# Patient Record
Sex: FEMALE | Race: WHITE | NOT HISPANIC OR LATINO | Employment: FULL TIME | URBAN - METROPOLITAN AREA
[De-identification: names, ages, dates, MRNs, and addresses within clinical notes are randomized per-mention and may not be internally consistent; named-entity substitution may affect disease eponyms.]

---

## 2024-05-23 LAB
EXTERNAL HEPATITIS B SURFACE ANTIGEN: NEGATIVE
EXTERNAL HEPATITIS C AB: POSITIVE
EXTERNAL RUBELLA QUALITATIVE: NORMAL
EXTERNAL SYPHILIS RPR SCREEN: NEGATIVE
HIV1 P24 AG SERPL QL IA: NORMAL

## 2024-06-06 ENCOUNTER — HOSPITAL ENCOUNTER (EMERGENCY)
Facility: HOSPITAL | Age: 29
Discharge: HOME | End: 2024-06-06
Attending: EMERGENCY MEDICINE | Admitting: EMERGENCY MEDICINE
Payer: OTHER MISCELLANEOUS

## 2024-06-06 ENCOUNTER — APPOINTMENT (OUTPATIENT)
Dept: ULTRASOUND IMAGING | Facility: HOSPITAL | Age: 29
End: 2024-06-06
Payer: OTHER MISCELLANEOUS

## 2024-06-06 VITALS
WEIGHT: 170 LBS | BODY MASS INDEX: 25.76 KG/M2 | HEART RATE: 75 BPM | TEMPERATURE: 97.8 F | OXYGEN SATURATION: 96 % | RESPIRATION RATE: 14 BRPM | SYSTOLIC BLOOD PRESSURE: 102 MMHG | DIASTOLIC BLOOD PRESSURE: 74 MMHG | HEIGHT: 68 IN

## 2024-06-06 DIAGNOSIS — Z77.098 EXPOSURE TO INDUSTRIAL FUMES: Primary | ICD-10-CM

## 2024-06-06 DIAGNOSIS — Z3A.11 11 WEEKS GESTATION OF PREGNANCY: ICD-10-CM

## 2024-06-06 LAB
BILIRUB UR QL STRIP: NEGATIVE
CLARITY UR: CLEAR
COHGB MFR BLD: 2.3 % (ref 0–3)
COLOR UR: YELLOW
GLUCOSE UR STRIP-MCNC: NEGATIVE MG/DL
HCG INTACT+B SERPL-ACNC: NORMAL MIU/ML
HGB UR QL STRIP.AUTO: NEGATIVE
KETONES UR QL STRIP: NEGATIVE
LEUKOCYTE ESTERASE UR QL STRIP.AUTO: NEGATIVE
NITRITE UR QL STRIP: NEGATIVE
PH UR STRIP.AUTO: 5.5 [PH] (ref 5–8)
PROT UR QL STRIP: NEGATIVE
SP GR UR STRIP: 1.01 (ref 1–1.03)
UROBILINOGEN UR QL STRIP: NORMAL

## 2024-06-06 PROCEDURE — 99284 EMERGENCY DEPT VISIT MOD MDM: CPT

## 2024-06-06 PROCEDURE — 93976 VASCULAR STUDY: CPT

## 2024-06-06 PROCEDURE — 82375 ASSAY CARBOXYHB QUANT: CPT | Performed by: EMERGENCY MEDICINE

## 2024-06-06 PROCEDURE — 84702 CHORIONIC GONADOTROPIN TEST: CPT | Performed by: EMERGENCY MEDICINE

## 2024-06-06 PROCEDURE — 96374 THER/PROPH/DIAG INJ IV PUSH: CPT

## 2024-06-06 PROCEDURE — 25010000002 METOCLOPRAMIDE PER 10 MG: Performed by: EMERGENCY MEDICINE

## 2024-06-06 PROCEDURE — 81003 URINALYSIS AUTO W/O SCOPE: CPT | Performed by: EMERGENCY MEDICINE

## 2024-06-06 PROCEDURE — 25810000003 LACTATED RINGERS SOLUTION: Performed by: EMERGENCY MEDICINE

## 2024-06-06 PROCEDURE — 76801 OB US < 14 WKS SINGLE FETUS: CPT

## 2024-06-06 RX ORDER — SODIUM CHLORIDE 0.9 % (FLUSH) 0.9 %
10 SYRINGE (ML) INJECTION AS NEEDED
Status: DISCONTINUED | OUTPATIENT
Start: 2024-06-06 | End: 2024-06-06 | Stop reason: HOSPADM

## 2024-06-06 RX ORDER — METOCLOPRAMIDE HYDROCHLORIDE 5 MG/ML
10 INJECTION INTRAMUSCULAR; INTRAVENOUS ONCE
Status: COMPLETED | OUTPATIENT
Start: 2024-06-06 | End: 2024-06-06

## 2024-06-06 RX ADMIN — SODIUM CHLORIDE, POTASSIUM CHLORIDE, SODIUM LACTATE AND CALCIUM CHLORIDE 500 ML: 600; 310; 30; 20 INJECTION, SOLUTION INTRAVENOUS at 09:14

## 2024-06-06 RX ADMIN — METOCLOPRAMIDE 10 MG: 5 INJECTION, SOLUTION INTRAMUSCULAR; INTRAVENOUS at 09:15

## 2024-06-06 NOTE — DISCHARGE INSTRUCTIONS
Follow-up with your OB/GYN on the 14th as scheduled.  Off of work today rest plenty of fluids  Return for severe headaches vomiting shortness of breath abdominal pain vaginal bleeding or any other new or worse problems or concerns return immediately to the ER.

## 2024-06-06 NOTE — ED PROVIDER NOTES
Subjective   History of Present Illness  Chief complaint pregnancy and exposure to fumes    History of present illness a 28-year-old female who is about 13 weeks pregnant who states she was at work indoors when the workplace was overcome with some sort of fumes which they think is propane.  Not quite clear.  But she started to feel nauseous and somewhat lightheaded and had a headache so EMS suggested she go to the hospital get checked since she is 13 weeks pregnant.  Upon arrival here she states she had a little abdominal cramping but she feels somewhat nauseous little bit of a headache but no vomiting.  No bleeding.  But is feeling better.  No cough congestion or shortness of breath no recent injury illness flus viruses vaccinations.  She has been seen during this pregnancy by the nurse practitioner and has had good fetal heart tones.  Denies urinary or bowel problems      Review of Systems   Constitutional:  Negative for chills and fever.   HENT:  Negative for sore throat, trouble swallowing and voice change.    Respiratory:  Negative for cough, chest tightness and shortness of breath.    Gastrointestinal:  Positive for abdominal pain. Negative for vomiting.   Genitourinary:  Negative for difficulty urinating, dysuria and vaginal bleeding.   Skin:  Negative for rash.   Neurological:  Positive for headaches. Negative for dizziness, facial asymmetry, speech difficulty and light-headedness.       No past medical history on file.  Currently 13 weeks pregnant  No Known Allergies    No past surgical history on file.    No family history on file.    Social History     Socioeconomic History    Marital status:      No tobacco alcohol or drugs  No medication    Objective   Physical Exam  Constitutional 28-year-old female awake alert no distress triage vital signs reviewed.  HEENT extraocular muscles are intact pupils equal and reactive no photophobia mouth is clear there is no cough or stridor drooling or edema.   Voice is normal neck is supple no adenopathy no meningeal signs lungs clear no retraction no use of accessory heart regular without murmur abdomen soft nontender good bowel sounds no peritoneal findings or other masses extremities no edema is no rash anywhere in no cords or Homans' sign no evidence of DVT skin warm dry without rashes neurologic awake alert and orientated x 4 no Paci symmetry speech normal without focal weakness walks without difficulty no ataxia  Procedures           ED Course      Results for orders placed or performed during the hospital encounter of 06/06/24   hCG, Quantitative, Pregnancy    Specimen: Blood   Result Value Ref Range    HCG Quantitative 69,621.00 mIU/mL   Urinalysis With Microscopic If Indicated (No Culture) - Urine, Clean Catch    Specimen: Urine, Clean Catch   Result Value Ref Range    Color, UA Yellow Yellow, Straw    Appearance, UA Clear Clear    pH, UA 5.5 5.0 - 8.0    Specific Gravity, UA 1.014 1.005 - 1.030    Glucose, UA Negative Negative    Ketones, UA Negative Negative    Bilirubin, UA Negative Negative    Blood, UA Negative Negative    Protein, UA Negative Negative    Leuk Esterase, UA Negative Negative    Nitrite, UA Negative Negative    Urobilinogen, UA 1.0 E.U./dL 0.2 - 1.0 E.U./dL   Carbon Monoxide, Blood    Specimen: Blood   Result Value Ref Range    Carbon Monoxide, Blood 2.3 0 - 3 %     US Ob < 14 Weeks Single or First Gestation    Result Date: 6/6/2024  Impression: Viable approximate 11-week 6-day IUP. Electronically Signed: Enid Hall MD  6/6/2024 10:24 AM EDT  Workstation ID: [id number]   Medications   sodium chloride 0.9 % flush 10 mL (has no administration in time range)   lactated ringers bolus 500 mL (500 mL Intravenous New Bag 6/6/24 0914)   metoclopramide (REGLAN) injection 10 mg (10 mg Intravenous Given 6/6/24 0915)                                              Medical Decision Making  Medical decision making IV established fetal heart tones were good  lactated Ringer's 500 cc bolus Reglan 10 mg IV for nausea car monoxide level reviewed by me was 2.3 urine negative pregnancy test 69,621.  All labs independent reviewed by me ultrasound obtained my review of the radiology report normal intrauterine pregnancy at 11 weeks 6 days normal heartbeat.  Patient exam was feeling better after the fluids and Reglan.  Her abdomen soft without tenderness.  I do not see any evidence of car monoxide exposure I do not see any evidence that suggest airway compromise or pneumonia or pneumonitis or any other type of anaphylaxis allergic reaction or any evidence for UTI or miscarriage or ectopic pregnancy or other intra-abdominal process or cardiac etiology.  Patient has been exposed to some sort of fumes most likely propane but is feeling much better she will be discharged home for outpatient management and follow-up she has an upcoming appoint with her OB/GYN we talked about what to return for stable otherwise unremarkable ER course    Problems Addressed:  11 weeks gestation of pregnancy: complicated acute illness or injury  Exposure to industrial fumes: complicated acute illness or injury    Amount and/or Complexity of Data Reviewed  Labs: ordered. Decision-making details documented in ED Course.  Radiology: ordered.        Final diagnoses:   Exposure to industrial fumes   11 weeks gestation of pregnancy       ED Disposition  ED Disposition       ED Disposition   Discharge    Condition   Stable    Comment   --               PATIENT CONNECTION - [facility] [address]  [phone]  In 1 day           Medication List      No changes were made to your prescriptions during this visit.            Shahab Galindo MD  06/07/24 0871

## 2024-06-18 ENCOUNTER — OFFICE VISIT (OUTPATIENT)
Dept: ENDOCRINOLOGY | Facility: CLINIC | Age: 29
End: 2024-06-18
Payer: MEDICAID

## 2024-06-18 ENCOUNTER — LAB (OUTPATIENT)
Dept: LAB | Facility: HOSPITAL | Age: 29
End: 2024-06-18
Payer: MEDICAID

## 2024-06-18 VITALS
HEART RATE: 84 BPM | HEIGHT: 68 IN | SYSTOLIC BLOOD PRESSURE: 110 MMHG | OXYGEN SATURATION: 99 % | WEIGHT: 171 LBS | DIASTOLIC BLOOD PRESSURE: 72 MMHG | BODY MASS INDEX: 25.91 KG/M2

## 2024-06-18 DIAGNOSIS — R94.6 ABNORMAL THYROID FUNCTION TEST: Primary | ICD-10-CM

## 2024-06-18 DIAGNOSIS — R94.6 ABNORMAL THYROID FUNCTION TEST: ICD-10-CM

## 2024-06-18 PROCEDURE — 84439 ASSAY OF FREE THYROXINE: CPT

## 2024-06-18 PROCEDURE — 36415 COLL VENOUS BLD VENIPUNCTURE: CPT

## 2024-06-18 PROCEDURE — 1160F RVW MEDS BY RX/DR IN RCRD: CPT | Performed by: INTERNAL MEDICINE

## 2024-06-18 PROCEDURE — 99204 OFFICE O/P NEW MOD 45 MIN: CPT | Performed by: INTERNAL MEDICINE

## 2024-06-18 PROCEDURE — 84443 ASSAY THYROID STIM HORMONE: CPT

## 2024-06-18 PROCEDURE — 84481 FREE ASSAY (FT-3): CPT

## 2024-06-18 PROCEDURE — 86376 MICROSOMAL ANTIBODY EACH: CPT

## 2024-06-18 PROCEDURE — 1159F MED LIST DOCD IN RCRD: CPT | Performed by: INTERNAL MEDICINE

## 2024-06-18 PROCEDURE — 84445 ASSAY OF TSI GLOBULIN: CPT

## 2024-06-18 RX ORDER — DIPHENHYDRAMINE HYDROCHLORIDE 25 MG/1
CAPSULE ORAL
COMMUNITY
Start: 2024-05-09 | End: 2024-06-18

## 2024-06-18 RX ORDER — BENZOCAINE/MENTHOL 6 MG-10 MG
LOZENGE MUCOUS MEMBRANE
COMMUNITY
Start: 2024-05-14 | End: 2024-06-18

## 2024-06-18 RX ORDER — PNV NO.95/FERROUS FUM/FOLIC AC 28MG-0.8MG
1 TABLET ORAL DAILY
COMMUNITY
Start: 2024-06-05

## 2024-06-18 RX ORDER — FERROUS SULFATE 325(65) MG
325 TABLET ORAL
COMMUNITY

## 2024-06-18 NOTE — LETTER
June 18, 2024     Kalie Barnes MD  [facility] [facility] [address]  [place] [address]    Patient: Natalie GALEANO   YOB: 1995   Date of Visit: 6/18/2024     Dear Kalie Barnes MD:       Thank you for referring Natalie GALEANO to me for evaluation. Below are the relevant portions of my assessment and plan of care.    If you have questions, please do not hesitate to call me. I look forward to following Natalie along with you.         Sincerely,        Sven Edwards MD        CC: No Recipients    Sven Edwards MD  06/18/24 1558  Sign when Signing Visit  Endocrine Consult Outpatient  Referred by Dr. Kalie Barnes for abnormal thyroid function test in pregnancy  Patient Care Team:  Provider, No Known as PCP - General     Chief Complaint: Low TSH/abnormal thyroid function tests         HPI: This is a 28-year-old female who is accompanied today with her  for abnormal thyroid function test which was found during pregnancy.  She tells me that she is 13 weeks pregnant and this is her pregnancy #4.  She did not have any thyroid problems during the first 3 pregnancies or in between pregnancies.  She is taking a multivitamin and iron supplement but she is not taking any biotin or any other supplements at this time.  She denies any tremors, sweating, palpitations, mood swings or any other significant issues at this time.    Past Medical History:   Diagnosis Date   • Hepatitis C        Social History     Socioeconomic History   • Marital status:      Spouse name: Imtiaz   • Number of children: 2   • Years of education: GED   Tobacco Use   • Smoking status: Never   • Smokeless tobacco: Never   Vaping Use   • Vaping status: Every Day   • Substances: Nicotine   • Devices: Disposable   Substance and Sexual Activity   • Alcohol use: Never   • Drug use: Never   • Sexual activity: Yes       History reviewed. No pertinent family history.    No Known Allergies    ROS:   Constitutional:   "Admit fatigue, tiredness.    Eyes:  Denies change in visual acuity   HENT:  Denies nasal congestion or sore throat   Respiratory: denies cough, shortness of breath.   Cardiovascular:  denies chest pain, edema   GI:  Denies abdominal pain,Admit  AM morning sickness  :  Denies dysuria   Musculoskeletal:  Denies back pain or joint pain   Integument:  Denies dry skin, rash   Neurologic:  Denies headache, focal weakness or sensory changes   Endocrine:  Denies polyuria or polydipsia   Psychiatric:  Denies depression or anxiety      Vitals:    06/18/24 1458   BP: 110/72   Pulse: 84   SpO2: 99%     Body mass index is 26 kg/m².      Physical Exam:  GEN: NAD, conversant  EYES: EOMI, PERRL  NECK: no thyromegaly, full ROM   CV: RRR  LUNG: CTA  SKIN: no rashes, no acanthosis  MSK: no deformities,   NEURO: no tremors, DTR normal  PSYCH: Awake and coherent      Results Review:     I reviewed the patient's new clinical results.    Lab Results   Component Value Date    BUN 13 09/17/2018    CREATININE 0.9 09/17/2018    BCR 14.4 09/17/2018    K 2.8 (L) 09/17/2018    CO2 24 09/17/2018    CALCIUM 9.4 09/17/2018    ALBUMIN 4.3 09/17/2018    LABIL2 1.2 09/17/2018    AST 79 (H) 09/17/2018    ALT 45 09/17/2018     No results found for: \"HGBA1C\"  Lab Results   Component Value Date    CREATININE 0.9 09/17/2018     Labs from May 25, 2024 showed a TSH of 0.12, free T4 was 1.2.    Medication Review: Reviewed.       Current Outpatient Medications:   •  ferrous sulfate 325 (65 FE) MG tablet, Take 1 tablet by mouth Daily With Breakfast., Disp: , Rfl:   •  Prenatal Vit-Fe Fumarate-FA (prenatal vitamin 28-0.8) 28-0.8 MG tablet tablet, Take 1 tablet by mouth Daily., Disp: , Rfl:         Assessment and plan:  Abnormal thyroid function test: This is a 28-year-old female who is currently 13 weeks pregnant, she was found to have mild low TSH with normal free T4.  This could be a physiological response from pregnancy.  I will try to explain this and " answered all questions.  At this time we will recheck TSH with maternal free T4 as well as free T3 and TSI and TPO antibodies.  Will make further recommendations once the labs are available.    Thank you very much for the consultation.    Sven Edwards MD FACE.

## 2024-06-18 NOTE — PROGRESS NOTES
Endocrine Consult Outpatient  Referred by Dr. Kalie Barnes for abnormal thyroid function test in pregnancy  Patient Care Team:  Provider, No Known as PCP - General     Chief Complaint: Low TSH/abnormal thyroid function tests         HPI: This is a 28-year-old female who is accompanied today with her  for abnormal thyroid function test which was found during pregnancy.  She tells me that she is 13 weeks pregnant and this is her pregnancy #4.  She did not have any thyroid problems during the first 3 pregnancies or in between pregnancies.  She is taking a multivitamin and iron supplement but she is not taking any biotin or any other supplements at this time.  She denies any tremors, sweating, palpitations, mood swings or any other significant issues at this time.    Past Medical History:   Diagnosis Date    Hepatitis C        Social History     Socioeconomic History    Marital status:      Spouse name: Imtiaz    Number of children: 2    Years of education: GED   Tobacco Use    Smoking status: Never    Smokeless tobacco: Never   Vaping Use    Vaping status: Every Day    Substances: Nicotine    Devices: Disposable   Substance and Sexual Activity    Alcohol use: Never    Drug use: Never    Sexual activity: Yes       History reviewed. No pertinent family history.    No Known Allergies    ROS:   Constitutional:  Admit fatigue, tiredness.    Eyes:  Denies change in visual acuity   HENT:  Denies nasal congestion or sore throat   Respiratory: denies cough, shortness of breath.   Cardiovascular:  denies chest pain, edema   GI:  Denies abdominal pain,Admit  AM morning sickness  :  Denies dysuria   Musculoskeletal:  Denies back pain or joint pain   Integument:  Denies dry skin, rash   Neurologic:  Denies headache, focal weakness or sensory changes   Endocrine:  Denies polyuria or polydipsia   Psychiatric:  Denies depression or anxiety      Vitals:    06/18/24 1458   BP: 110/72   Pulse: 84   SpO2: 99%     Body  "mass index is 26 kg/m².      Physical Exam:  GEN: NAD, conversant  EYES: EOMI, PERRL  NECK: no thyromegaly, full ROM   CV: RRR  LUNG: CTA  SKIN: no rashes, no acanthosis  MSK: no deformities,   NEURO: no tremors, DTR normal  PSYCH: Awake and coherent      Results Review:     I reviewed the patient's new clinical results.    Lab Results   Component Value Date    BUN 13 09/17/2018    CREATININE 0.9 09/17/2018    BCR 14.4 09/17/2018    K 2.8 (L) 09/17/2018    CO2 24 09/17/2018    CALCIUM 9.4 09/17/2018    ALBUMIN 4.3 09/17/2018    LABIL2 1.2 09/17/2018    AST 79 (H) 09/17/2018    ALT 45 09/17/2018     No results found for: \"HGBA1C\"  Lab Results   Component Value Date    CREATININE 0.9 09/17/2018     Labs from May 25, 2024 showed a TSH of 0.12, free T4 was 1.2.    Medication Review: Reviewed.       Current Outpatient Medications:     ferrous sulfate 325 (65 FE) MG tablet, Take 1 tablet by mouth Daily With Breakfast., Disp: , Rfl:     Prenatal Vit-Fe Fumarate-FA (prenatal vitamin 28-0.8) 28-0.8 MG tablet tablet, Take 1 tablet by mouth Daily., Disp: , Rfl:         Assessment and plan:  Abnormal thyroid function test: This is a 28-year-old female who is currently 13 weeks pregnant, she was found to have mild low TSH with normal free T4.  This could be a physiological response from pregnancy.  I will try to explain this and answered all questions.  At this time we will recheck TSH with maternal free T4 as well as free T3 and TSI and TPO antibodies.  Will make further recommendations once the labs are available.    Thank you very much for the consultation.    Sven Edwards MD FACE.                   "

## 2024-06-19 ENCOUNTER — TELEPHONE (OUTPATIENT)
Dept: ENDOCRINOLOGY | Facility: CLINIC | Age: 29
End: 2024-06-19

## 2024-06-19 LAB
FREE T4, MATERNAL: 1.34 NG/DL
T3FREE SERPL-MCNC: 3.34 PG/ML (ref 2–4.4)
TSH SERPL DL<=0.05 MIU/L-ACNC: 0.52 UIU/ML (ref 0.27–4.2)

## 2024-06-20 LAB — THYROPEROXIDASE AB SERPL-ACNC: <9 IU/ML (ref 0–34)

## 2024-06-22 LAB — TSI SER-ACNC: <0.1 IU/L (ref 0–0.55)

## 2024-07-10 ENCOUNTER — HOSPITAL ENCOUNTER (OUTPATIENT)
Facility: HOSPITAL | Age: 29
Discharge: HOME | End: 2024-07-10
Attending: EMERGENCY MEDICINE
Payer: COMMERCIAL

## 2024-07-10 VITALS
TEMPERATURE: 97.9 F | WEIGHT: 180 LBS | RESPIRATION RATE: 18 BRPM | HEART RATE: 90 BPM | BODY MASS INDEX: 27.28 KG/M2 | SYSTOLIC BLOOD PRESSURE: 122 MMHG | DIASTOLIC BLOOD PRESSURE: 72 MMHG | OXYGEN SATURATION: 99 % | HEIGHT: 68 IN

## 2024-07-10 DIAGNOSIS — O21.9 VOMITING DURING PREGNANCY: Primary | ICD-10-CM

## 2024-07-10 LAB
BILIRUB UR QL STRIP: NEGATIVE
CLARITY UR: CLEAR
COLOR UR: YELLOW
GLUCOSE UR STRIP-MCNC: NEGATIVE MG/DL
HGB UR QL STRIP.AUTO: NEGATIVE
KETONES UR QL STRIP: NEGATIVE
LEUKOCYTE ESTERASE UR QL STRIP.AUTO: NEGATIVE
NITRITE UR QL STRIP: NEGATIVE
PH UR STRIP.AUTO: 6 [PH] (ref 5–8)
PROT UR QL STRIP: NEGATIVE
SP GR UR STRIP: >=1.03 (ref 1–1.03)
UROBILINOGEN UR QL STRIP: NORMAL

## 2024-07-10 PROCEDURE — 81003 URINALYSIS AUTO W/O SCOPE: CPT | Performed by: EMERGENCY MEDICINE

## 2024-07-10 PROCEDURE — G0463 HOSPITAL OUTPT CLINIC VISIT: HCPCS | Performed by: EMERGENCY MEDICINE

## 2024-07-10 PROCEDURE — 63710000001 ONDANSETRON ODT 4 MG TABLET DISPERSIBLE: Performed by: EMERGENCY MEDICINE

## 2024-07-10 RX ORDER — ONDANSETRON 4 MG/1
4 TABLET, ORALLY DISINTEGRATING ORAL ONCE
Status: COMPLETED | OUTPATIENT
Start: 2024-07-10 | End: 2024-07-10

## 2024-07-10 RX ORDER — ONDANSETRON 4 MG/1
4 TABLET, ORALLY DISINTEGRATING ORAL EVERY 6 HOURS PRN
Qty: 12 TABLET | Refills: 0 | Status: SHIPPED | OUTPATIENT
Start: 2024-07-10

## 2024-07-10 RX ADMIN — ONDANSETRON 4 MG: 4 TABLET, ORALLY DISINTEGRATING ORAL at 21:58

## 2024-07-10 NOTE — Clinical Note
[place] [address] [place] [address] [place] [address]  Phone: [phone]    Natalie GALEANO was seen and treated in our emergency department on 7/10/2024.  She may return to work on 07/11/2024.  Patient arrived at our facility at 848 pm and was discharged from the facility at 1045 pm on 7/10/24       Thank you for choosing [facility].    August Hutson MD

## 2024-07-10 NOTE — Clinical Note
[place] [address] [place] [address] [place] [address]  Phone: [phone]    Natalie GALEANO was seen and treated in our emergency department on 7/10/2024.  She may return to work on 07/12/2024.  Patient arrived at facility at        Thank you for choosing [facility].    August Hutson MD

## 2024-07-10 NOTE — Clinical Note
[facility] [address] [place] [address] [place] [address]  Phone: [phone]    Natalie GALEANO was seen and treated in our emergency department on 7/10/2024.  She may return to work on 07/12/2024.  Patient arrived at our facility at 848 pm and was discharged from our facility at 1045 pm on 7/10/2024       Thank you for choosing [facility].    August Hutson MD

## 2024-07-10 NOTE — Clinical Note
[facility] [place] [address] [place] [address] [place] [address]  Phone: [phone]    Natalie GALEANO was seen and treated in our emergency department on 7/10/2024.  She may return to work on 07/12/2024.         Thank you for choosing [facility].    August Hutson MD

## 2024-07-11 NOTE — FSED PROVIDER NOTE
Subjective   History of Present Illness  28-year-old white female presents with vomiting in pregnancy.  She is 16 weeks pregnant and started vomiting last night.  She has vomited a total of 6 times, most recently this morning.  He did not come in earlier because she needed her  to be home from work to bring her.  She is not currently nauseous.  She denies any diarrhea, fever, chest pain, shortness of breath, or cough.  She has had some chills with this.  She has not taken any medications.  Her due date is Christmas.  Her next appointment with her OB/GYN is Friday.  This is her fourth pregnancy.  No Known Drug Allergies.        Review of Systems    Past Medical History:   Diagnosis Date    Hepatitis C        No Known Allergies    No past surgical history on file.    No family history on file.    Social History     Socioeconomic History    Marital status:      Spouse name: Imtiaz    Number of children: 2    Years of education: GED   Tobacco Use    Smoking status: Never    Smokeless tobacco: Never   Vaping Use    Vaping status: Every Day    Substances: Nicotine    Devices: Disposable   Substance and Sexual Activity    Alcohol use: Never    Drug use: Never    Sexual activity: Yes           Objective   Physical Exam  Vitals and nursing note reviewed.   Constitutional:       Appearance: Normal appearance. She is well-developed.   HENT:      Head: Normocephalic and atraumatic.      Nose: Nose normal.      Mouth/Throat:      Mouth: Mucous membranes are moist.   Eyes:      Extraocular Movements: Extraocular movements intact.      Pupils: Pupils are equal, round, and reactive to light.   Cardiovascular:      Rate and Rhythm: Normal rate and regular rhythm.      Heart sounds: Normal heart sounds.   Pulmonary:      Effort: Pulmonary effort is normal.      Breath sounds: Normal breath sounds. No wheezing, rhonchi or rales.   Chest:      Chest wall: No tenderness.   Abdominal:      General: There is no distension.       Palpations: Abdomen is soft.      Tenderness: There is no abdominal tenderness. There is no guarding or rebound.   Musculoskeletal:         General: No swelling. Normal range of motion.      Cervical back: Normal range of motion.   Skin:     General: Skin is warm and dry.   Neurological:      General: No focal deficit present.      Mental Status: She is alert and oriented to person, place, and time.   Psychiatric:         Mood and Affect: Mood normal.         Behavior: Behavior normal.         Procedures           ED Course                                           Medical Decision Making  Patient presents with having had 6 episodes of vomiting at home starting last night with the last episode this morning.  This may or may not be related to her pregnancy.  She did not have a ride to come in earlier.  I discussed the option to start the check blood work and give her IV fluids just to make sure she does not have a urinary tract infection and give her some Zofran since she has been eating today and holding food down.  She prefers the latter route.  Urine was completely clear and negative for UTI but had a concentrated specific gravity of greater than 1.03.  Patient was given a Zofran ODT and has been tolerating fluids.  Abdomen repeat exam remains soft, nondistended nontender.  Patient is asking for a work note as she typically goes to work at 5 AM and did not go today due to feeling poorly.  I will give her 1 for tomorrow also because she was here until after 10:00 and would likely benefit from the rest.  She is to call her doctor tomorrow and should keep her appointment on Friday.  Return if worse or concerns.  Patient is happy with this plan.    Problems Addressed:  Vomiting during pregnancy: complicated acute illness or injury    Risk  Prescription drug management.        Final diagnoses:   Vomiting during pregnancy       ED Disposition  ED Disposition       ED Disposition   Discharge    Condition   Stable     Comment   --               Provider, No Known  [facility] [place] [address]    Call in 1 day           Medication List        New Prescriptions      ondansetron ODT 4 MG disintegrating tablet  Commonly known as: ZOFRAN-ODT  Take 1 tablet by mouth Every 6 (Six) Hours As Needed for Nausea or Vomiting for up to 12 doses.               Where to Get Your Medications        These medications were sent to [id number] - [place], [place] - [address] AT [facility] [address] - [phone]  - [phone] FX  [address] [facility] [place] [address]      Phone: [phone]   ondansetron ODT 4 MG disintegrating tablet

## 2024-07-11 NOTE — DISCHARGE INSTRUCTIONS
This vomiting is not necessarily due to your pregnancy.  [place] diet as tolerated.  Frequent small amounts of fluid.  Call your doctor tomorrow and keep your appointment on Friday.  Return if worse or concerns.

## 2024-10-29 ENCOUNTER — PHARMACY IMMUNIZATION REVIEW (OUTPATIENT)
Dept: PHARMACY | Facility: HOSPITAL | Age: 29
End: 2024-10-29
Payer: COMMERCIAL

## 2024-10-29 NOTE — PROGRESS NOTES
[facility] Vaccination Administration    Patient reported to [facility] via referral on 10/29/2024    Allergies:    Patient has no known allergies.    Vaccine History:   Immunization History   Administered Date(s) Administered    ABRYSVO (RSV, 60+ or pregnant women 32-36 wks) 10/29/2024    Tdap 10/29/2024       Plan:    The following vaccines were administered today:  Tdap and RSV    Judith Castaneda  10/29/2024  13:53 EDT

## 2024-10-29 NOTE — PROGRESS NOTES
I have reviewed the notes, assessments, and/or procedures performed by Tiera Castaneda, pharmacy technician, I concur with her documentation of   Natalie GALEANO.

## 2024-11-26 LAB — EXTERNAL GROUP B STREP ANTIGEN: POSITIVE

## 2024-12-07 ENCOUNTER — HOSPITAL ENCOUNTER (OUTPATIENT)
Facility: HOSPITAL | Age: 29
Discharge: HOME | End: 2024-12-07
Attending: OBSTETRICS & GYNECOLOGY | Admitting: OBSTETRICS & GYNECOLOGY
Payer: COMMERCIAL

## 2024-12-07 VITALS
RESPIRATION RATE: 20 BRPM | HEART RATE: 96 BPM | WEIGHT: 235.01 LBS | DIASTOLIC BLOOD PRESSURE: 76 MMHG | SYSTOLIC BLOOD PRESSURE: 114 MMHG | TEMPERATURE: 98.5 F | OXYGEN SATURATION: 99 % | BODY MASS INDEX: 35.62 KG/M2 | HEIGHT: 68 IN

## 2024-12-07 PROBLEM — Z34.90 PREGNANT: Status: ACTIVE | Noted: 2024-12-07

## 2024-12-07 LAB
BILIRUB UR QL STRIP: NEGATIVE
CLARITY UR: CLEAR
COLOR UR: YELLOW
GLUCOSE UR STRIP-MCNC: NEGATIVE MG/DL
HGB UR QL STRIP.AUTO: NEGATIVE
KETONES UR QL STRIP: NEGATIVE
LEUKOCYTE ESTERASE UR QL STRIP.AUTO: NEGATIVE
NITRITE UR QL STRIP: NEGATIVE
PH UR STRIP.AUTO: <=5 [PH] (ref 5–8)
PROT UR QL STRIP: NEGATIVE
SP GR UR STRIP: 1.02 (ref 1–1.03)
UROBILINOGEN UR QL STRIP: NORMAL

## 2024-12-07 PROCEDURE — G0463 HOSPITAL OUTPT CLINIC VISIT: HCPCS

## 2024-12-07 PROCEDURE — 87086 URINE CULTURE/COLONY COUNT: CPT | Performed by: OBSTETRICS & GYNECOLOGY

## 2024-12-07 PROCEDURE — 81003 URINALYSIS AUTO W/O SCOPE: CPT | Performed by: OBSTETRICS & GYNECOLOGY

## 2024-12-09 LAB — BACTERIA SPEC AEROBE CULT: NORMAL

## 2024-12-18 ENCOUNTER — HOSPITAL ENCOUNTER (OUTPATIENT)
Dept: LABOR AND DELIVERY | Facility: HOSPITAL | Age: 29
Discharge: HOME | End: 2024-12-18
Payer: COMMERCIAL

## 2024-12-18 ENCOUNTER — HOSPITAL ENCOUNTER (INPATIENT)
Facility: HOSPITAL | Age: 29
LOS: 2 days | Discharge: HOME | End: 2024-12-20
Attending: OBSTETRICS & GYNECOLOGY | Admitting: OBSTETRICS & GYNECOLOGY
Payer: COMMERCIAL

## 2024-12-18 ENCOUNTER — PREP FOR SURGERY (OUTPATIENT)
Dept: OTHER | Facility: HOSPITAL | Age: 29
End: 2024-12-18
Payer: COMMERCIAL

## 2024-12-18 ENCOUNTER — ANESTHESIA EVENT (OUTPATIENT)
Dept: LABOR AND DELIVERY | Facility: HOSPITAL | Age: 29
End: 2024-12-18
Payer: COMMERCIAL

## 2024-12-18 ENCOUNTER — ANESTHESIA (OUTPATIENT)
Dept: LABOR AND DELIVERY | Facility: HOSPITAL | Age: 29
End: 2024-12-18
Payer: COMMERCIAL

## 2024-12-18 PROBLEM — Z34.90 ENCOUNTER FOR INDUCTION OF LABOR: Status: ACTIVE | Noted: 2024-12-18

## 2024-12-18 LAB
ABO GROUP BLD: NORMAL
AMPHET+METHAMPHET UR QL: NEGATIVE
AMPHETAMINES UR QL: NEGATIVE
BARBITURATES UR QL SCN: NEGATIVE
BASOPHILS # BLD AUTO: 0.04 10*3/MM3 (ref 0–0.2)
BASOPHILS NFR BLD AUTO: 0.4 % (ref 0–1.5)
BENZODIAZ UR QL SCN: NEGATIVE
BLD GP AB SCN SERPL QL: NEGATIVE
BUPRENORPHINE SERPL-MCNC: NEGATIVE NG/ML
CANNABINOIDS SERPL QL: NEGATIVE
COCAINE UR QL: NEGATIVE
DEPRECATED RDW RBC AUTO: 40.1 FL (ref 37–54)
EOSINOPHIL # BLD AUTO: 0.04 10*3/MM3 (ref 0–0.4)
EOSINOPHIL NFR BLD AUTO: 0.4 % (ref 0.3–6.2)
ERYTHROCYTE [DISTWIDTH] IN BLOOD BY AUTOMATED COUNT: 13.1 % (ref 12.3–15.4)
HCT VFR BLD AUTO: 31.6 % (ref 34–46.6)
HGB BLD-MCNC: 10.4 G/DL (ref 12–15.9)
IMM GRANULOCYTES # BLD AUTO: 0.2 10*3/MM3 (ref 0–0.05)
IMM GRANULOCYTES NFR BLD AUTO: 2.1 % (ref 0–0.5)
LYMPHOCYTES # BLD AUTO: 1.18 10*3/MM3 (ref 0.7–3.1)
LYMPHOCYTES NFR BLD AUTO: 12.7 % (ref 19.6–45.3)
MCH RBC QN AUTO: 28 PG (ref 26.6–33)
MCHC RBC AUTO-ENTMCNC: 32.9 G/DL (ref 31.5–35.7)
MCV RBC AUTO: 85.2 FL (ref 79–97)
METHADONE UR QL SCN: NEGATIVE
MONOCYTES # BLD AUTO: 0.66 10*3/MM3 (ref 0.1–0.9)
MONOCYTES NFR BLD AUTO: 7.1 % (ref 5–12)
NEUTROPHILS NFR BLD AUTO: 7.19 10*3/MM3 (ref 1.7–7)
NEUTROPHILS NFR BLD AUTO: 77.3 % (ref 42.7–76)
NRBC BLD AUTO-RTO: 0 /100 WBC (ref 0–0.2)
OPIATES UR QL: NEGATIVE
OXYCODONE UR QL SCN: NEGATIVE
PCP UR QL SCN: NEGATIVE
PLATELET # BLD AUTO: 227 10*3/MM3 (ref 140–450)
PMV BLD AUTO: 9.7 FL (ref 6–12)
RBC # BLD AUTO: 3.71 10*6/MM3 (ref 3.77–5.28)
RH BLD: POSITIVE
T&S EXPIRATION DATE: NORMAL
TREPONEMA PALLIDUM IGG+IGM AB [PRESENCE] IN SERUM OR PLASMA BY IMMUNOASSAY: NORMAL
TRICYCLICS UR QL SCN: NEGATIVE
WBC NRBC COR # BLD AUTO: 9.31 10*3/MM3 (ref 3.4–10.8)

## 2024-12-18 PROCEDURE — 88307 TISSUE EXAM BY PATHOLOGIST: CPT | Performed by: OBSTETRICS & GYNECOLOGY

## 2024-12-18 PROCEDURE — 85025 COMPLETE CBC W/AUTO DIFF WBC: CPT | Performed by: OBSTETRICS & GYNECOLOGY

## 2024-12-18 PROCEDURE — C1755 CATHETER, INTRASPINAL: HCPCS | Performed by: ANESTHESIOLOGY

## 2024-12-18 PROCEDURE — 25810000003 LACTATED RINGERS PER 1000 ML: Performed by: OBSTETRICS & GYNECOLOGY

## 2024-12-18 PROCEDURE — 3E033VJ INTRODUCTION OF OTHER HORMONE INTO PERIPHERAL VEIN, PERCUTANEOUS APPROACH: ICD-10-PCS | Performed by: OBSTETRICS & GYNECOLOGY

## 2024-12-18 PROCEDURE — 86901 BLOOD TYPING SEROLOGIC RH(D): CPT | Performed by: OBSTETRICS & GYNECOLOGY

## 2024-12-18 PROCEDURE — 86901 BLOOD TYPING SEROLOGIC RH(D): CPT

## 2024-12-18 PROCEDURE — 80306 DRUG TEST PRSMV INSTRMNT: CPT | Performed by: OBSTETRICS & GYNECOLOGY

## 2024-12-18 PROCEDURE — 25010000002 PENICILLIN G POTASSIUM PER 600000 UNITS: Performed by: OBSTETRICS & GYNECOLOGY

## 2024-12-18 PROCEDURE — 86780 TREPONEMA PALLIDUM: CPT | Performed by: OBSTETRICS & GYNECOLOGY

## 2024-12-18 PROCEDURE — 10907ZC DRAINAGE OF AMNIOTIC FLUID, THERAPEUTIC FROM PRODUCTS OF CONCEPTION, VIA NATURAL OR ARTIFICIAL OPENING: ICD-10-PCS | Performed by: OBSTETRICS & GYNECOLOGY

## 2024-12-18 PROCEDURE — 94799 UNLISTED PULMONARY SVC/PX: CPT

## 2024-12-18 PROCEDURE — 86900 BLOOD TYPING SEROLOGIC ABO: CPT | Performed by: OBSTETRICS & GYNECOLOGY

## 2024-12-18 PROCEDURE — 86900 BLOOD TYPING SEROLOGIC ABO: CPT

## 2024-12-18 PROCEDURE — 86850 RBC ANTIBODY SCREEN: CPT | Performed by: OBSTETRICS & GYNECOLOGY

## 2024-12-18 PROCEDURE — 25010000002 LIDOCAINE-EPINEPHRINE (PF) 1.5 %-1:200000 SOLUTION: Performed by: ANESTHESIOLOGY

## 2024-12-18 RX ORDER — ACETAMINOPHEN 325 MG/1
650 TABLET ORAL EVERY 4 HOURS PRN
Status: DISCONTINUED | OUTPATIENT
Start: 2024-12-18 | End: 2024-12-18

## 2024-12-18 RX ORDER — OXYTOCIN/0.9 % SODIUM CHLORIDE 30/500 ML
250 PLASTIC BAG, INJECTION (ML) INTRAVENOUS CONTINUOUS
Status: ACTIVE | OUTPATIENT
Start: 2024-12-18 | End: 2024-12-18

## 2024-12-18 RX ORDER — OXYTOCIN/0.9 % SODIUM CHLORIDE 30/500 ML
2-20 PLASTIC BAG, INJECTION (ML) INTRAVENOUS
Status: CANCELLED | OUTPATIENT
Start: 2024-12-18

## 2024-12-18 RX ORDER — METHYLERGONOVINE MALEATE 0.2 MG/ML
200 INJECTION INTRAVENOUS ONCE AS NEEDED
Status: CANCELLED | OUTPATIENT
Start: 2024-12-18

## 2024-12-18 RX ORDER — ONDANSETRON 4 MG/1
4 TABLET, ORALLY DISINTEGRATING ORAL EVERY 6 HOURS PRN
Status: CANCELLED | OUTPATIENT
Start: 2024-12-18

## 2024-12-18 RX ORDER — ONDANSETRON 4 MG/1
4 TABLET, ORALLY DISINTEGRATING ORAL EVERY 6 HOURS PRN
Status: DISCONTINUED | OUTPATIENT
Start: 2024-12-18 | End: 2024-12-19 | Stop reason: HOSPADM

## 2024-12-18 RX ORDER — OXYTOCIN/0.9 % SODIUM CHLORIDE 30/500 ML
999 PLASTIC BAG, INJECTION (ML) INTRAVENOUS ONCE
Status: DISCONTINUED | OUTPATIENT
Start: 2024-12-18 | End: 2024-12-19 | Stop reason: HOSPADM

## 2024-12-18 RX ORDER — METHYLERGONOVINE MALEATE 0.2 MG/ML
200 INJECTION INTRAVENOUS ONCE AS NEEDED
Status: DISCONTINUED | OUTPATIENT
Start: 2024-12-18 | End: 2024-12-19 | Stop reason: HOSPADM

## 2024-12-18 RX ORDER — CARBOPROST TROMETHAMINE 250 UG/ML
250 INJECTION, SOLUTION INTRAMUSCULAR AS NEEDED
Status: DISCONTINUED | OUTPATIENT
Start: 2024-12-18 | End: 2024-12-19 | Stop reason: HOSPADM

## 2024-12-18 RX ORDER — OXYTOCIN/0.9 % SODIUM CHLORIDE 30/500 ML
999 PLASTIC BAG, INJECTION (ML) INTRAVENOUS ONCE
Status: CANCELLED | OUTPATIENT
Start: 2024-12-18 | End: 2024-12-18

## 2024-12-18 RX ORDER — LIDOCAINE HYDROCHLORIDE 10 MG/ML
0.5 INJECTION, SOLUTION EPIDURAL; INFILTRATION; INTRACAUDAL; PERINEURAL ONCE AS NEEDED
Status: DISCONTINUED | OUTPATIENT
Start: 2024-12-18 | End: 2024-12-18

## 2024-12-18 RX ORDER — ONDANSETRON 2 MG/ML
4 INJECTION INTRAMUSCULAR; INTRAVENOUS EVERY 6 HOURS PRN
Status: DISCONTINUED | OUTPATIENT
Start: 2024-12-18 | End: 2024-12-18

## 2024-12-18 RX ORDER — ACETAMINOPHEN 325 MG/1
650 TABLET ORAL EVERY 4 HOURS PRN
Status: CANCELLED | OUTPATIENT
Start: 2024-12-18

## 2024-12-18 RX ORDER — ONDANSETRON 2 MG/ML
4 INJECTION INTRAMUSCULAR; INTRAVENOUS EVERY 6 HOURS PRN
Status: CANCELLED | OUTPATIENT
Start: 2024-12-18

## 2024-12-18 RX ORDER — SODIUM CHLORIDE 0.9 % (FLUSH) 0.9 %
10 SYRINGE (ML) INJECTION EVERY 12 HOURS SCHEDULED
Status: CANCELLED | OUTPATIENT
Start: 2024-12-18

## 2024-12-18 RX ORDER — SODIUM CHLORIDE 9 MG/ML
40 INJECTION, SOLUTION INTRAVENOUS AS NEEDED
Status: DISCONTINUED | OUTPATIENT
Start: 2024-12-18 | End: 2024-12-18

## 2024-12-18 RX ORDER — SODIUM CHLORIDE 0.9 % (FLUSH) 0.9 %
10 SYRINGE (ML) INJECTION AS NEEDED
Status: DISCONTINUED | OUTPATIENT
Start: 2024-12-18 | End: 2024-12-18

## 2024-12-18 RX ORDER — SODIUM CHLORIDE 0.9 % (FLUSH) 0.9 %
10 SYRINGE (ML) INJECTION EVERY 12 HOURS SCHEDULED
Status: DISCONTINUED | OUTPATIENT
Start: 2024-12-18 | End: 2024-12-18

## 2024-12-18 RX ORDER — EPHEDRINE SULFATE 5 MG/ML
10 INJECTION INTRAVENOUS
Status: DISCONTINUED | OUTPATIENT
Start: 2024-12-18 | End: 2024-12-18

## 2024-12-18 RX ORDER — OXYTOCIN/0.9 % SODIUM CHLORIDE 30/500 ML
2-20 PLASTIC BAG, INJECTION (ML) INTRAVENOUS
Status: DISCONTINUED | OUTPATIENT
Start: 2024-12-18 | End: 2024-12-18

## 2024-12-18 RX ORDER — SODIUM CHLORIDE, SODIUM LACTATE, POTASSIUM CHLORIDE, CALCIUM CHLORIDE 600; 310; 30; 20 MG/100ML; MG/100ML; MG/100ML; MG/100ML
50 INJECTION, SOLUTION INTRAVENOUS CONTINUOUS
Status: CANCELLED | OUTPATIENT
Start: 2024-12-18 | End: 2024-12-21

## 2024-12-18 RX ORDER — MISOPROSTOL 200 UG/1
800 TABLET ORAL AS NEEDED
Status: DISCONTINUED | OUTPATIENT
Start: 2024-12-18 | End: 2024-12-19 | Stop reason: HOSPADM

## 2024-12-18 RX ORDER — SODIUM CHLORIDE 0.9 % (FLUSH) 0.9 %
10 SYRINGE (ML) INJECTION AS NEEDED
Status: CANCELLED | OUTPATIENT
Start: 2024-12-18

## 2024-12-18 RX ORDER — LIDOCAINE HYDROCHLORIDE AND EPINEPHRINE 15; 5 MG/ML; UG/ML
INJECTION, SOLUTION EPIDURAL AS NEEDED
Status: DISCONTINUED | OUTPATIENT
Start: 2024-12-18 | End: 2024-12-18 | Stop reason: SURG

## 2024-12-18 RX ORDER — MISOPROSTOL 200 UG/1
800 TABLET ORAL AS NEEDED
Status: CANCELLED | OUTPATIENT
Start: 2024-12-18

## 2024-12-18 RX ORDER — IBUPROFEN 600 MG/1
600 TABLET, FILM COATED ORAL EVERY 6 HOURS PRN
Status: CANCELLED | OUTPATIENT
Start: 2024-12-18

## 2024-12-18 RX ORDER — IBUPROFEN 600 MG/1
600 TABLET, FILM COATED ORAL EVERY 6 HOURS PRN
Status: DISCONTINUED | OUTPATIENT
Start: 2024-12-18 | End: 2024-12-19 | Stop reason: HOSPADM

## 2024-12-18 RX ORDER — ONDANSETRON 4 MG/1
4 TABLET, ORALLY DISINTEGRATING ORAL EVERY 6 HOURS PRN
Status: DISCONTINUED | OUTPATIENT
Start: 2024-12-18 | End: 2024-12-18

## 2024-12-18 RX ORDER — LIDOCAINE HYDROCHLORIDE 10 MG/ML
0.5 INJECTION, SOLUTION EPIDURAL; INFILTRATION; INTRACAUDAL; PERINEURAL ONCE AS NEEDED
Status: CANCELLED | OUTPATIENT
Start: 2024-12-18

## 2024-12-18 RX ORDER — SODIUM CHLORIDE 9 MG/ML
40 INJECTION, SOLUTION INTRAVENOUS AS NEEDED
Status: CANCELLED | OUTPATIENT
Start: 2024-12-18

## 2024-12-18 RX ORDER — SODIUM CHLORIDE, SODIUM LACTATE, POTASSIUM CHLORIDE, CALCIUM CHLORIDE 600; 310; 30; 20 MG/100ML; MG/100ML; MG/100ML; MG/100ML
50 INJECTION, SOLUTION INTRAVENOUS CONTINUOUS
Status: DISCONTINUED | OUTPATIENT
Start: 2024-12-18 | End: 2024-12-18

## 2024-12-18 RX ORDER — LIDOCAINE HYDROCHLORIDE AND EPINEPHRINE 15; 5 MG/ML; UG/ML
INJECTION, SOLUTION EPIDURAL
Status: COMPLETED
Start: 2024-12-18 | End: 2024-12-18

## 2024-12-18 RX ORDER — CARBOPROST TROMETHAMINE 250 UG/ML
250 INJECTION, SOLUTION INTRAMUSCULAR AS NEEDED
Status: CANCELLED | OUTPATIENT
Start: 2024-12-18

## 2024-12-18 RX ORDER — OXYTOCIN/0.9 % SODIUM CHLORIDE 30/500 ML
250 PLASTIC BAG, INJECTION (ML) INTRAVENOUS CONTINUOUS
Status: CANCELLED | OUTPATIENT
Start: 2024-12-18 | End: 2024-12-18

## 2024-12-18 RX ORDER — FENTANYL/BUPIVACAINE/NS/PF 2-1250MCG
PLASTIC BAG, INJECTION (ML) INJECTION
Status: ACTIVE
Start: 2024-12-18 | End: 2024-12-19

## 2024-12-18 RX ORDER — ONDANSETRON 2 MG/ML
4 INJECTION INTRAMUSCULAR; INTRAVENOUS EVERY 6 HOURS PRN
Status: DISCONTINUED | OUTPATIENT
Start: 2024-12-18 | End: 2024-12-19 | Stop reason: HOSPADM

## 2024-12-18 RX ORDER — FENTANYL/BUPIVACAINE/NS/PF 2-1250MCG
PLASTIC BAG, INJECTION (ML) INJECTION CONTINUOUS
Status: DISCONTINUED | OUTPATIENT
Start: 2024-12-18 | End: 2024-12-18

## 2024-12-18 RX ORDER — ACETAMINOPHEN 325 MG/1
650 TABLET ORAL EVERY 4 HOURS PRN
Status: DISCONTINUED | OUTPATIENT
Start: 2024-12-18 | End: 2024-12-19 | Stop reason: HOSPADM

## 2024-12-18 RX ADMIN — SODIUM CHLORIDE 2.5 MILLION UNITS: 900 INJECTION, SOLUTION INTRAVENOUS at 16:55

## 2024-12-18 RX ADMIN — Medication 2 MILLI-UNITS/MIN: at 12:48

## 2024-12-18 RX ADMIN — LIDOCAINE HYDROCHLORIDE,EPINEPHRINE BITARTRATE 5 MG: 15; .005 INJECTION, SOLUTION EPIDURAL; INFILTRATION; INTRACAUDAL; PERINEURAL at 16:44

## 2024-12-18 RX ADMIN — SODIUM CHLORIDE 5 MILLION UNITS: 900 INJECTION INTRAVENOUS at 12:49

## 2024-12-18 RX ADMIN — SODIUM CHLORIDE, SODIUM LACTATE, POTASSIUM CHLORIDE, CALCIUM CHLORIDE 50 ML/HR: 20; 30; 600; 310 INJECTION, SOLUTION INTRAVENOUS at 12:48

## 2024-12-18 NOTE — PLAN OF CARE
Goal Outcome Evaluation:  Plan of Care Reviewed With: patient, spouse        Progress: improving  Outcome Evaluation: Patient is currently 3-4 cm dilated and has made change since IOL started st 1245.  Patient has epidural and rating pain at a 0 on pain scale.  Patient did well through the epidural one blood pressure drop that was brought back to WNL by 250 mL fluid bolus.  Patient has a urinary catheter.  AROM at 1520, clear fluid, large amount.

## 2024-12-18 NOTE — H&P
ALLAN Renee  Obstetric History and Physical     Chief Complaint: IOL at term    Subjective     Patient is a 29 y.o. female  currently at 39w1d, who presents for IOL at term.    Her prenatal care is c/b hx Hep C, anemia.      Prenatal Information:  External Prenatal Results       Pregnancy Outside Results - Transcribed From Office Records - See Scanned Records For Details       Test Value Date Time    ABO  O  24 1159    Rh  Positive  24 1159    Antibody Screen  Negative  24 1159    Varicella IgG       Rubella ^ Immune  24     Hgb  10.4 g/dL 24 1159    Hct  31.6 % 24 1159    HgB A1c        1h GTT       3h GTT Fasting       3h GTT 1 hour       3h GTT 2 hour       3h GTT 3 hour        Gonorrhea (discrete)       Chlamydia (discrete)       RPR ^ Negative  24     Syphils cascade: TP-Ab (FTA)       TP-Ab       TP-Ab (EIA)       TPPA       HBsAg ^ Negative  24     Herpes Simplex Virus PCR       Herpes Simplex VIrus Culture       HIV ^ Non-Reactive  24     Hep C RNA Quant PCR       Hep C Antibody ^ Positive  24     AFP       NIPT       Cystic Fibrosis (Amrik)       Cystic Fibroisis        Spinal Muscular atrophy       Fragile X       Group B Strep ^ Positive  24     GBS Susceptibility to Clindamycin       GBS Susceptibility to Erythromycin       Fetal Fibronectin       Genetic Testing, Maternal Blood                 Drug Screening       Test Value Date Time    Urine Drug Screen       Amphetamine Screen  Negative  24 1206    Barbiturate Screen  Negative  24 1206    Benzodiazepine Screen  Negative  24 1206    Methadone Screen  Negative  24 1206    Phencyclidine Screen  Negative  24 1206    Opiates Screen  Negative  24 1206    THC Screen  Negative  24 1206    Cocaine Screen       Propoxyphene Screen       Buprenorphine Screen  Negative  24 1206    Methamphetamine Screen       Oxycodone Screen  Negative  24  1206    Tricyclic Antidepressants Screen  Negative  24 1206              Legend    ^: Historical                             Past OB History:    Pregnancy History    #1  []: M/. Devin Masters 39wks Vag [facility] comments: no complications  #2  [2015]: F/. Hugh Franco 39wks Vag [facility] comments: no complications  #3  [2018]: [facility] comments: d&c  #4        Past Medical History: Past Medical History:   Diagnosis Date    Hepatitis C         Past Surgical History History reviewed. No pertinent surgical history.     Family History: History reviewed. No pertinent family history.   Social History:  reports that she has never smoked. She has never used smokeless tobacco.   reports no history of alcohol use.   reports no history of drug use.        General ROS: Pertinent items are noted in HPI    Objective      Vitals:     Vitals:    24 1330 24 1400 24 1430 24 1500   BP: 112/79 114/80 111/71    BP Location:       Patient Position:       Pulse: 94 101 83 95   Resp:       Temp:       TempSrc:       SpO2:       Weight:       Height:           Fetal Heart Rate Assessment:   130, mod variability    Alleene:   Every 3-5 min     Physical Exam:     General Appearance:    Alert, cooperative, in no acute distress   Abdomen:     Soft, non-tender, EFW 7 1/2-8 lbs   Pelvic Exam:    Presentation: vtx    Cervix: was checked (by me): 3-4 cm / 75% % / -1 and AROM- Clear, pelvis clinically adequate   Extremities:   Moves all extremities well   Skin:   No bleeding, bruising or rash         Laboratory Results:   Lab Results (last 48 hours)       Procedure Component Value Units Date/Time    Hepatitis B Surface Antigen [[id number]] Resulted: 24    Specimen: Blood Updated: 24 1343     External Hepatitis B Surface Ag Negative    RPR Qualitative with Reflex to Quant [[id number]] Resulted: 24    Specimen: Blood Updated: 24 1343     External RPR Negative    Rubella Antibody, IgG [[id number]] Resulted:  05/23/24    Specimen: Blood Updated: 12/18/24 1343     External Rubella Qual Immune    HIV-1 Antibody, EIA [[id number]] Resulted: 05/23/24    Specimen: Blood Updated: 12/18/24 1343     External HIV Antibody Non-Reactive    Group B Streptococcus Culture - Swab, Vaginal/Rectum [[id number]] Resulted: 11/26/24    Specimen: Swab from Vaginal/Rectum Updated: 12/18/24 1343     External Strep Group B Ag Positive    Hepatitis C Antibody [[id number]] Resulted: 05/23/24    Specimen: Blood Updated: 12/18/24 1343     External Hepatitis C Ab Positive    Urine Drug Screen - Urine, Clean Catch [[id number]]  (Normal) Collected: 12/18/24 1206    Specimen: Urine, Clean Catch Updated: 12/18/24 1230     THC, Screen, Urine Negative     Phencyclidine (PCP), Urine Negative     Cocaine Screen, Urine Negative     Methamphetamine, Ur Negative     Opiate Screen Negative     Amphetamine Screen, Urine Negative     Benzodiazepine Screen, Urine Negative     Tricyclic Antidepressants Screen Negative     Methadone Screen, Urine Negative     Barbiturates Screen, Urine Negative     Oxycodone Screen, Urine Negative     Buprenorphine, Screen, Urine Negative    Narrative:      Cutoff For Drugs Screened:    Amphetamines               500 ng/ml  Barbiturates               200 ng/ml  Benzodiazepines            150 ng/ml  Cocaine                    150 ng/ml  Methadone                  200 ng/ml  Opiates                    100 ng/ml  Phencyclidine               25 ng/ml  THC                         50 ng/ml  Methamphetamine            500 ng/ml  Tricyclic Antidepressants  300 ng/ml  Oxycodone                  100 ng/ml  Buprenorphine               10 ng/ml    The normal value for all drugs tested is negative. This report includes unconfirmed screening results, with the cutoff values listed, to be used for medical treatment purposes only.  Unconfirmed results must not be used for non-medical purposes such as employment or legal testing.  Clinical consideration  should be applied to any drug of abuse test, particularly when unconfirmed results are used.    All urine drugs of abuse requests without chain of custody are for medical screening purposes only.  False positives are possible.      CBC & Differential [[id number]]  (Abnormal) Collected: 12/18/24 1159    Specimen: Blood from Arm, Right Updated: 12/18/24 1221    Narrative:      The following orders were created for panel order CBC & Differential.  Procedure                               Abnormality         Status                     ---------                               -----------         ------                     CBC Auto Differential[[id number]]        Abnormal            Final result                 Please view results for these tests on the individual orders.    CBC Auto Differential [[id number]]  (Abnormal) Collected: 12/18/24 1159    Specimen: Blood from Arm, Right Updated: 12/18/24 1221     WBC 9.31 10*3/mm3      RBC 3.71 10*6/mm3      Hemoglobin 10.4 g/dL      Hematocrit 31.6 %      MCV 85.2 fL      MCH 28.0 pg      MCHC 32.9 g/dL      RDW 13.1 %      RDW-SD 40.1 fl      MPV 9.7 fL      Platelets 227 10*3/mm3      Neutrophil % 77.3 %      Lymphocyte % 12.7 %      Monocyte % 7.1 %      Eosinophil % 0.4 %      Basophil % 0.4 %      Immature Grans % 2.1 %      Neutrophils, Absolute 7.19 10*3/mm3      Lymphocytes, Absolute 1.18 10*3/mm3      Monocytes, Absolute 0.66 10*3/mm3      Eosinophils, Absolute 0.04 10*3/mm3      Basophils, Absolute 0.04 10*3/mm3      Immature Grans, Absolute 0.20 10*3/mm3      nRBC 0.0 /100 WBC     Treponema pallidum AB w/Reflex RPR [[id number]] Collected: 12/18/24 1159    Specimen: Blood from Arm, Right Updated: 12/18/24 1218               Assessment & Plan     Principal Problem:    Encounter for induction of labor         Assessment:  1.  Intrauterine pregnancy at 39w1d gestation with reassuring fetal status.    2.  IOL at term  3.  GBS status:   External Strep Group B Ag   Date Value Ref  Range Status   11/26/2024 Positive  Final     4.  FSR    Plan:  1. Vaginal anticipated  2. PCN G for GBS prophylaxis       Kalie Barnes MD   12/18/2024   15:34 EST

## 2024-12-18 NOTE — ANESTHESIA PROCEDURE NOTES
Labor Epidural      Patient reassessed immediately prior to procedure    Patient location during procedure: OB  Indication:at surgeon's request  Performed By  Anesthesiologist: Tin Redman MD  Preanesthetic Checklist  Completed: patient identified, IV checked, site marked, risks and benefits discussed, surgical consent, monitors and equipment checked, pre-op evaluation and timeout performed  Prep:  Pt Position:sitting  Sterile Tech:gloves, cap, sterile barrier and mask  Prep:chlorhexidine gluconate and isopropyl alcohol  Monitoring:continuous pulse oximetry, EKG and blood pressure monitoring  Epidural Block Procedure:  Approach:midline  Guidance:palpation technique  Location:L3-L4  Needle Type:Tuohy  Needle Gauge:17 G  Loss of Resistance Medium: saline  Loss of Resistance: 8cm  Cath Depth at skin:14 cm  Paresthesia: none  Aspiration:negative  Test Dose:negative  Number of Attempts: 1  Post Assessment:  Dressing:secured with tape and occlusive dressing applied  Pt Tolerance:patient tolerated the procedure well with no apparent complications  Complications:no

## 2024-12-18 NOTE — ANESTHESIA PREPROCEDURE EVALUATION
Anesthesia Evaluation     Patient summary reviewed and Nursing notes reviewed   no history of anesthetic complications:   NPO Solid Status: N/A  NPO Liquid Status: N/A           Airway   Mallampati: II  TM distance: >3 FB  Neck ROM: full  No difficulty expected  Dental      Pulmonary - negative pulmonary ROS and normal exam   Cardiovascular - negative cardio ROS and normal exam        Neuro/Psych- negative ROS  GI/Hepatic/Renal/Endo    (+) hepatitis C    Musculoskeletal (-) negative ROS    Abdominal  - normal exam   Substance History - negative use     OB/GYN    (+) Pregnant        Other                          Anesthesia Plan    ASA 2     epidural       Anesthetic plan, risks, benefits, and alternatives have been provided, discussed and informed consent has been obtained with: patient.        CODE STATUS:    Level Of Support Discussed With: Patient  Code Status (Patient has no pulse and is not breathing): CPR (Attempt to Resuscitate)  Medical Interventions (Patient has pulse or is breathing): Full Support

## 2024-12-19 LAB
BASOPHILS # BLD AUTO: 0.05 10*3/MM3 (ref 0–0.2)
BASOPHILS NFR BLD AUTO: 0.3 % (ref 0–1.5)
DEPRECATED RDW RBC AUTO: 40.3 FL (ref 37–54)
EOSINOPHIL # BLD AUTO: 0.06 10*3/MM3 (ref 0–0.4)
EOSINOPHIL NFR BLD AUTO: 0.4 % (ref 0.3–6.2)
ERYTHROCYTE [DISTWIDTH] IN BLOOD BY AUTOMATED COUNT: 13 % (ref 12.3–15.4)
HCT VFR BLD AUTO: 30.8 % (ref 34–46.6)
HGB BLD-MCNC: 10 G/DL (ref 12–15.9)
IMM GRANULOCYTES # BLD AUTO: 0.18 10*3/MM3 (ref 0–0.05)
IMM GRANULOCYTES NFR BLD AUTO: 1.3 % (ref 0–0.5)
LYMPHOCYTES # BLD AUTO: 1.64 10*3/MM3 (ref 0.7–3.1)
LYMPHOCYTES NFR BLD AUTO: 11.5 % (ref 19.6–45.3)
MCH RBC QN AUTO: 27.5 PG (ref 26.6–33)
MCHC RBC AUTO-ENTMCNC: 32.5 G/DL (ref 31.5–35.7)
MCV RBC AUTO: 84.8 FL (ref 79–97)
MONOCYTES # BLD AUTO: 1.06 10*3/MM3 (ref 0.1–0.9)
MONOCYTES NFR BLD AUTO: 7.4 % (ref 5–12)
NEUTROPHILS NFR BLD AUTO: 11.31 10*3/MM3 (ref 1.7–7)
NEUTROPHILS NFR BLD AUTO: 79.1 % (ref 42.7–76)
NRBC BLD AUTO-RTO: 0 /100 WBC (ref 0–0.2)
PLATELET # BLD AUTO: 209 10*3/MM3 (ref 140–450)
PMV BLD AUTO: 9.6 FL (ref 6–12)
RBC # BLD AUTO: 3.63 10*6/MM3 (ref 3.77–5.28)
WBC NRBC COR # BLD AUTO: 14.3 10*3/MM3 (ref 3.4–10.8)

## 2024-12-19 PROCEDURE — 85025 COMPLETE CBC W/AUTO DIFF WBC: CPT | Performed by: OBSTETRICS & GYNECOLOGY

## 2024-12-19 PROCEDURE — 97161 PT EVAL LOW COMPLEX 20 MIN: CPT

## 2024-12-19 RX ORDER — BISACODYL 10 MG
10 SUPPOSITORY, RECTAL RECTAL DAILY PRN
Status: DISCONTINUED | OUTPATIENT
Start: 2024-12-19 | End: 2024-12-20 | Stop reason: HOSPADM

## 2024-12-19 RX ORDER — CALCIUM CARBONATE 500 MG/1
2 TABLET, CHEWABLE ORAL 3 TIMES DAILY PRN
Status: DISCONTINUED | OUTPATIENT
Start: 2024-12-19 | End: 2024-12-20 | Stop reason: HOSPADM

## 2024-12-19 RX ORDER — IBUPROFEN 600 MG/1
600 TABLET, FILM COATED ORAL EVERY 6 HOURS PRN
Status: DISCONTINUED | OUTPATIENT
Start: 2024-12-19 | End: 2024-12-20 | Stop reason: HOSPADM

## 2024-12-19 RX ORDER — ACETAMINOPHEN 325 MG/1
650 TABLET ORAL EVERY 6 HOURS PRN
Status: DISCONTINUED | OUTPATIENT
Start: 2024-12-19 | End: 2024-12-20 | Stop reason: HOSPADM

## 2024-12-19 RX ORDER — ONDANSETRON 4 MG/1
4 TABLET, ORALLY DISINTEGRATING ORAL EVERY 8 HOURS PRN
Status: DISCONTINUED | OUTPATIENT
Start: 2024-12-19 | End: 2024-12-20 | Stop reason: HOSPADM

## 2024-12-19 RX ORDER — HYDROCODONE BITARTRATE AND ACETAMINOPHEN 5; 325 MG/1; MG/1
1 TABLET ORAL EVERY 4 HOURS PRN
Status: DISCONTINUED | OUTPATIENT
Start: 2024-12-19 | End: 2024-12-20 | Stop reason: HOSPADM

## 2024-12-19 RX ORDER — HYDROCORTISONE ACETATE PRAMOXINE HCL 2.5; 1 G/100G; G/100G
1 CREAM TOPICAL AS NEEDED
Status: DISCONTINUED | OUTPATIENT
Start: 2024-12-19 | End: 2024-12-20 | Stop reason: HOSPADM

## 2024-12-19 RX ORDER — OXYTOCIN/0.9 % SODIUM CHLORIDE 30/500 ML
125 PLASTIC BAG, INJECTION (ML) INTRAVENOUS ONCE AS NEEDED
Status: DISCONTINUED | OUTPATIENT
Start: 2024-12-19 | End: 2024-12-20 | Stop reason: HOSPADM

## 2024-12-19 RX ORDER — SODIUM CHLORIDE 0.9 % (FLUSH) 0.9 %
1-10 SYRINGE (ML) INJECTION AS NEEDED
Status: DISCONTINUED | OUTPATIENT
Start: 2024-12-19 | End: 2024-12-20 | Stop reason: HOSPADM

## 2024-12-19 RX ORDER — DOCUSATE SODIUM 100 MG/1
100 CAPSULE, LIQUID FILLED ORAL 2 TIMES DAILY
Status: DISCONTINUED | OUTPATIENT
Start: 2024-12-19 | End: 2024-12-20 | Stop reason: HOSPADM

## 2024-12-19 RX ORDER — PRENATAL VIT/IRON FUM/FOLIC AC 27MG-0.8MG
1 TABLET ORAL DAILY
Status: DISCONTINUED | OUTPATIENT
Start: 2024-12-19 | End: 2024-12-20 | Stop reason: HOSPADM

## 2024-12-19 RX ORDER — HYDROCODONE BITARTRATE AND ACETAMINOPHEN 10; 325 MG/1; MG/1
1 TABLET ORAL EVERY 4 HOURS PRN
Status: DISCONTINUED | OUTPATIENT
Start: 2024-12-19 | End: 2024-12-20 | Stop reason: HOSPADM

## 2024-12-19 RX ADMIN — ACETAMINOPHEN 650 MG: 325 TABLET, FILM COATED ORAL at 06:05

## 2024-12-19 RX ADMIN — IBUPROFEN 600 MG: 600 TABLET, FILM COATED ORAL at 02:51

## 2024-12-19 RX ADMIN — IBUPROFEN 600 MG: 600 TABLET, FILM COATED ORAL at 09:04

## 2024-12-19 RX ADMIN — IBUPROFEN 600 MG: 600 TABLET, FILM COATED ORAL at 22:07

## 2024-12-19 RX ADMIN — WITCH HAZEL: 500 SOLUTION RECTAL; TOPICAL at 02:28

## 2024-12-19 RX ADMIN — IBUPROFEN 600 MG: 600 TABLET, FILM COATED ORAL at 16:05

## 2024-12-19 RX ADMIN — DOCUSATE SODIUM 100 MG: 100 CAPSULE, LIQUID FILLED ORAL at 09:04

## 2024-12-19 RX ADMIN — PRENATAL VITAMINS-IRON FUMARATE 27 MG IRON-FOLIC ACID 0.8 MG TABLET 1 TABLET: at 09:04

## 2024-12-19 RX ADMIN — Medication 1 G: at 02:28

## 2024-12-19 NOTE — PLAN OF CARE
Goal Outcome Evaluation:        Problem: Postpartum (Vaginal Delivery)  Goal: Successful Parent Role Transition  Outcome: Progressing  Goal: Hemostasis  Outcome: Progressing  Goal: Absence of Infection Signs and Symptoms  Outcome: Progressing  Goal: Anesthesia/Sedation Recovery  Outcome: Progressing  Intervention: Optimize Anesthesia Recovery  Description: Assess and monitor airway, breathing and circulation; maintain close surveillance for deterioration.  Elevate head of bed, if able; facilitate regular position changes.  Assess and monitor neurovascular and neuromuscular function, such as motor strength, tone, posture, peripheral pulses and extremity sensation; protect areas of decreased sensation from heat, cold, medical devices or objects.  Individualize frequency and intensity of monitoring based on sedation or anesthesia administered, identified risk factors, ongoing assessment and organizational protocol.  Prepare for administration of pharmacologic therapy, such as reversal agent, antiemetic or antipruritic medication, to manage sedation or anesthesia effects.  Adjust environment to maintain safety (e.g., fall precautions, safety equipment).  Recent Flowsheet Documentation  Taken 12/19/2024 0605 by Michelle Ohara RN  Safety Promotion/Fall Prevention: safety round/check completed  Taken 12/19/2024 0602 by Michelle Ohara RN  Safety Promotion/Fall Prevention: safety round/check completed  Taken 12/19/2024 0520 by Michelle Ohara RN  Safety Promotion/Fall Prevention: safety round/check completed  Taken 12/19/2024 0433 by Michelle Ohara RN  Safety Promotion/Fall Prevention: safety round/check completed  Taken 12/19/2024 0251 by Michelle Ohara RN  Safety Promotion/Fall Prevention: safety round/check completed  Goal: Optimal Pain Control and Function  Outcome: Progressing  Intervention: Prevent or Manage Pain  Description: Set pain management goals; mutually determine pain management plan and  review plan regularly.  Use a consistent, validated tool for pain assessment, including function and quality of life; evaluate pain level, effect of treatment and patient's response at regular intervals.  Match pharmacologic analgesia to severity and type of pain mechanism; evaluate risk for opioid use and dependence; consider multimodal approach and titrate to patient response.  Manage medication-induced effects, such as constipation, pruritus, nausea, urinary retention, somnolence and dizziness.  Initiate individualized nonpharmacologic pain management measures; consider addition of complementary or alternative therapy.  Consider and address emotional response to pain.  Monitor perineal condition; note presence of hematoma, hemorrhoids and episiotomy appearance (if applicable).  Use cold application, as culturally-appropriate, to the perineal area for the first 24 to 48 hours following delivery for comfort.  Verify correct infant latch when breastfeeding to prevent nipple pain.  If engorgement occurs, encourage more frequent breastfeeding or pumping and storing additional milk to ease discomfort. Cold compresses, as culturally-appropriate, may be used if bottle-feeding.  If postdural puncture headache identified, encourage adequate hydration and anticipate the need for epidural blood patch.  If hemorrhoids are present and painful, offer topical pain relief and sitz baths for comfort.  Recent Flowsheet Documentation  Taken 12/19/2024 0605 by Michelle Ohara RN  Pain Management Interventions:   pain medication given   pain management plan reviewed with patient/caregiver  Taken 12/19/2024 0251 by Michelle Ohara RN  Pain Management Interventions: pain medication given  Goal: Effective Urinary Elimination  Outcome: Progressing     Problem: Breastfeeding  Goal: Effective Breastfeeding  Outcome: Progressing

## 2024-12-19 NOTE — ANESTHESIA POSTPROCEDURE EVALUATION
Patient: Natalie GALEANO    Procedure Summary       Date: 12/18/24 Room / Location:     Anesthesia Start: 1628 Anesthesia Stop: 2028    Procedure: LABOR ANALGESIA Diagnosis:     Scheduled Providers:  Provider: Tin Redman MD    Anesthesia Type: epidural ASA Status: 2            Anesthesia Type: epidural    Vitals  Vitals Value Taken Time   /106 12/18/24 2300   Temp 98.5 °F (36.9 °C) 12/18/24 2130   Pulse 100 12/18/24 2300   Resp 16 12/18/24 1730   SpO2 100 % 12/18/24 2300   Vitals shown include unfiled device data.        Post Anesthesia Care and Evaluation    Patient location during evaluation: bedside  Patient participation: complete - patient participated  Level of consciousness: awake  Pain scale: See nurse's notes for pain score.  Pain management: adequate    Airway patency: patent  Anesthetic complications: No anesthetic complications  PONV Status: none  Cardiovascular status: acceptable  Respiratory status: acceptable and spontaneous ventilation  Hydration status: acceptable  Post Neuraxial Block status: Motor and sensory function returned to baseline and No signs or symptoms of PDPH  Comments: Patient seen and examined postoperatively; vital signs stable; SpO2 greater than or equal to 90%; cardiopulmonary status stable; nausea/vomiting adequately controlled; pain adequately controlled; no apparent anesthesia complications; patient discharged from anesthesia care when discharge criteria were met

## 2024-12-19 NOTE — PROGRESS NOTES
ALLAN Renee  Postpartum Note    Subjective   Postpartum Day 1:  Spontaneous Vaginal Delivery    Patient without complaints. Her pain is well controlled with nonsteroidal anti-inflammatory drugs. She is ambulating well.  Patient describes her bleeding as thin lochia. No dizziness or fatigue.     Breastfeeding: pumping/bottle - infant in NICU    Objective     Vitals:  Vitals:    12/18/24 2230 12/18/24 2245 12/18/24 2249 12/19/24 0254   BP: 102/89 91/44 97/62 106/71   BP Location:    Left arm   Patient Position:    Lying   Pulse: 85 77 82 73   Resp:    16   Temp:    97.7 °F (36.5 °C)   TempSrc:    Oral   SpO2: 100% 100%  98%   Weight:       Height:           Physical Exam:  General:  Alert and oriented x3. No acute distress.  Abdomen: abdomen is soft without significant tenderness, masses, organomegaly or guarding. Fundus: appropriate, firm, non tender  Incision: N/A  Skin: Warm, Dry  Extremities: Normal,  trace edema. Nontender     Labs:  Results from last 7 days   Lab Units 12/19/24  0526 12/18/24  1159   WBC 10*3/mm3 14.30* 9.31   HEMOGLOBIN g/dL 10.0* 10.4*   HEMATOCRIT % 30.8* 31.6*   PLATELETS 10*3/mm3 209 227            Feeding method: Breastfeeding Status: No     Blood Type: RH Positive        Assessment & Plan     Principal Problem:    Encounter for induction of labor      Natalie GALEANO is Day 1  post-partum from a  Spontaneous Vaginal Delivery      Plan:  routine, continue present management, and plan d/c tomorrow.       Annie Estrada NP  12/19/2024  08:08 EST

## 2024-12-19 NOTE — PLAN OF CARE
Goal Outcome Evaluation:               Delivery of baby girl at 2028. Taken to warmer immediately after delivery for respiratory support. 1st degree midline and periurethral lacerations repaired. QBL 200cc. MD not present at bedside for delivery.

## 2024-12-19 NOTE — THERAPY EVALUATION
Patient Name: Natalie GALEANO  : 1995    MRN: [id number]                              Today's Date: 2024       Admit Date: 2024    Visit Dx: No diagnosis found.  Patient Active Problem List   Diagnosis    Abnormal thyroid function test    Pregnant    Encounter for induction of labor     Past Medical History:   Diagnosis Date    Hepatitis C      History reviewed. No pertinent surgical history.   General Information       [facility] Name 24 1527          Physical Therapy Time and Intention    Document Type evaluation  -     Mode of Treatment physical therapy  -[facility] Name 24 1527          General Information    Patient Profile Reviewed yes  -     Prior Level of Function independent:  -     Existing Precautions/Restrictions no known precautions/restrictions  -     Barriers to Rehab none identified  -[facility] Name 24 1527          Living Environment    People in Home spouse;child(vivi), dependent  -[facility] Name 24 1527          Home Main Entrance    Number of Stairs, Main Entrance none  -[facility] Name 24 1527          Stairs Within Home, Primary    Number of Stairs, Within Home, Primary none  -[facility] Name 24 1527          Cognition    Orientation Status (Cognition) oriented x 4  -               User Key  (r) = Recorded By, (t) = Taken By, (c) = Cosigned By      Initials Name Provider Type     Marti Christianson, PT Physical Therapist                   Mobility       Row Name 24 1528          Bed Mobility    Bed Mobility bed mobility (all) activities  -     All Activities, [place] (Bed Mobility) independent  -[facility] Name 24 1528          Sit-Stand Transfer    Sit-Stand [place] (Transfers) independent  -[facility] Name 24 1528          Gait/Stairs (Locomotion)    [place] Level (Gait) independent  -     Patient was able to Ambulate yes  -               User Key  (r) = Recorded By, (t)  = Taken By, (c) = Cosigned By      Initials Name Provider Type    Marti Edwards, [place] Physical Therapist                   Obj/Interventions       Row Name 24 1528          Range of Motion Comprehensive    General Range of Motion no range of motion deficits identified  -AH       Row Name 24 1528          Strength Comprehensive (MMT)    General Manual Muscle Testing (MMT) Assessment no strength deficits identified  -AH       Row Name 24 1528          Balance    Balance Assessment sitting static balance;sitting dynamic balance;standing static balance;standing dynamic balance  -     Static Sitting Balance independent  -     Dynamic Sitting Balance independent  -AH     Position, Sitting Balance unsupported  -     Static Standing Balance independent  -     Dynamic Standing Balance independent  -     Position/Device Used, Standing Balance unsupported  -AH       Row Name 24 1528          Sensory Assessment (Somatosensory)    Sensory Assessment (Somatosensory) sensation intact  -               User Key  (r) = Recorded By, (t) = Taken By, (c) = Cosigned By      Initials Name Provider Type    Marti Edwards, [place] Physical Therapist                   Goals/Plan    No documentation.                  Clinical Impression       Row Name 24 1528          Pain    Pretreatment Pain Rating 0/10 - no pain  -     Posttreatment Pain Rating 0/10 - no pain  -AH       Row Name 24 1528          Plan of Care Review    Plan of Care Reviewed With patient  -     Outcome Evaluation Pt is a 30 y/o F, , who came to [facility] at 39 wks 1 d gestation. Pt gave birth via vaginal delivery on 24 with a 1st deg periurtethral laceration noted. She lives with her spouse and 2 dependent children in an apartment. Today pt reported no pain but some mild abdominal cramping. She has been up ad humera with no mobility concerns at this time. During today's eval pt was educated on care  and healing post vaginal delivery, including abdominal pressure management with activity and bowel movement, recovery exercise, and perineal scar massage. She demonstrated good understanding of material and was given a handout with all education and contact information if any additional questions arise while inpatient or post d/c.  -       Row Name 24 1528          Therapy Assessment/Plan (PT)    Criteria for Skilled Interventions Met (PT) no  -     Therapy Frequency (PT) evaluation only  -       Row Name 24 1528          Positioning and Restraints    Pre-Treatment Position in bed  -     Post Treatment Position bed  -     In Bed notified nsg;fowlers;call light within reach;with family/caregiver  -               User Key  (r) = Recorded By, (t) = Taken By, (c) = Cosigned By      Initials Name Provider Type     Marti Christianson, [place] Physical Therapist                   Outcome Measures    No documentation.                                Physical Therapy Education       Title: PT OT SLP Therapies (Done)       Topic: Physical Therapy (Done)       Point: Home exercise program (Done)       Learning Progress Summary            Patient Acceptance, E, VU by  at 2024 1530                      Point: Body mechanics (Done)       Learning Progress Summary            Patient Acceptance, E, VU by  at 2024 1530                      Point: Precautions (Done)       Learning Progress Summary            Patient Acceptance, E, VU by  at 2024 1530                                      User Key       Initials Effective Dates Name Provider Type [facility] 24 -  Marti Christianson, PT Physical Therapist PT                  PT Recommendation and Plan     Outcome Evaluation: Pt is a 28 y/o F, , who came to [facility] at 39 wks 1 d gestation. Pt gave birth via vaginal delivery on 24 with a 1st deg periurtethral laceration noted. She lives with her spouse and 2  dependent children in an apartment. Today pt reported no pain but some mild abdominal cramping. She has been up ad humera with no mobility concerns at this time. During today's eval pt was educated on care and healing post vaginal delivery, including abdominal pressure management with activity and bowel movement, recovery exercise, and perineal scar massage. She demonstrated good understanding of material and was given a handout with all education and contact information if any additional questions arise while inpatient or post d/c.    Vaginal Delivery Education:  Body Changes After Pregnancy  Separation of the Abdominal Muscles   Body Mechanics After Vaginal Delivery  Toileting   Benefits of Exercise  Recovery and Return to Exercise: Week 0-6  Postpartum Stretches  Urinary Incontinence  Perineal Scar Massage  When to see a Pelvic Health PT     Time Calculation:         PT Charges       Row Name 12/19/24 1531             Time Calculation    Start Time 1314  -      Stop Time 1323  -      Time Calculation (min) 9 min  -      PT Received On 12/19/24  -                User Key  (r) = Recorded By, (t) = Taken By, (c) = Cosigned By      Initials Name Provider Type     Marti Christianson, PT Physical Therapist                  Therapy Charges for Today       Code Description Service Date Service Provider Modifiers Qty    [id number]  PT TYREE LOW COMPLEXITY 3 12/19/2024 Marti Christianson, PT GP 1               PT Discharge Summary  Anticipated Discharge Disposition (PT): home    Marti Christianson, PT  12/19/2024

## 2024-12-19 NOTE — L&D DELIVERY NOTE
[facility]  Vaginal Delivery Note    Pre-delivery diagnosis     1. 29 y.o.  at 39w1d  2. IOL at term    Post-delivery diagnosis  Same  Nuchal cord x 1    Delivery     Delivery:  Spontaneous Vaginal Delivery    Date of Delivery:   2024   Anesthesia:  Epidural    Delivering clinician:  Kalie Barnes MD      Pt presented to L&D for IOL at term. She was started on pitocin and had AROM with clear fluid. She progressed to C/C/+2.    She pushed for 6 minutes with excellent maternal effort. There was a fetal decel with . Due to this, the RN had pt push to deliver while I was next to her donning gloves. The head delivered in OA, then the shoulders and remainder delivered without difficulty. R shoulder anterior. The mouth and nose were suctioned. There was good cry, color, tone and movement of all extremities. The infant was placed on the mother's chest and abdomen and cared for by delivery team RN.  The cord was clamped and cut. The infant was taken to the warmer for further evaluation. Cord gasses and blood were drawn. The placenta delivered spontaneously, intact and with a 3 vessel cord. The uterus, cervix and vagina were explored. There was a 1st degree midline and periurethral laceration. Repaired with 3.0 Monocryl. Good cosmesis and hemostasis noted. Sponge and needle counts correct. The patient and infant were left to recover in L&D.    Infant    Findings: LVFI infant       Apgars:   4  @ 1 minute /   6  @ 5 minutes  9 @ 10 minutes         AB.253/-2.1  VB.388/-4.5    Placenta, Cord, and Fluid    Placenta delivered  spontaneous  3VC          Lacerations       1st degree and periurethral     Estimated Blood Loss 200 mL     Complications  none    Disposition  Mother to Mother Baby/Postpartum  in stable condition.  Baby remains with mom  in stable condition.      Kalie Barnes MD  24  20:51 EST

## 2024-12-19 NOTE — PLAN OF CARE
Goal Outcome Evaluation:  Plan of Care Reviewed With: patient           Outcome Evaluation: Pt is a 28 y/o F, , who came to [facility] at 39 wks 1 d gestation. Pt gave birth via vaginal delivery on 24 with a 1st deg periurtethral laceration noted. She lives with her spouse and 2 dependent children in an apartment. Today pt reported no pain but some mild abdominal cramping. She has been up ad humera with no mobility concerns at this time. During today's eval pt was educated on care and healing post vaginal delivery, including abdominal pressure management with activity and bowel movement, recovery exercise, and perineal scar massage. She demonstrated good understanding of material and was given a handout with all education and contact information if any additional questions arise while inpatient or post d/c.    Vaginal Delivery Education:  Body Changes After Pregnancy  Separation of the Abdominal Muscles   Body Mechanics After Vaginal Delivery  Toileting   Benefits of Exercise  Recovery and Return to Exercise: Week 0-6  Postpartum Stretches  Urinary Incontinence  Perineal Scar Massage  When to see a Pelvic Health PT    Anticipated Discharge Disposition (PT): home

## 2024-12-20 VITALS
HEART RATE: 80 BPM | RESPIRATION RATE: 17 BRPM | WEIGHT: 225 LBS | DIASTOLIC BLOOD PRESSURE: 73 MMHG | TEMPERATURE: 98.4 F | OXYGEN SATURATION: 99 % | HEIGHT: 68 IN | SYSTOLIC BLOOD PRESSURE: 118 MMHG | BODY MASS INDEX: 34.1 KG/M2

## 2024-12-20 RX ORDER — IBUPROFEN 600 MG/1
600 TABLET, FILM COATED ORAL EVERY 6 HOURS PRN
Qty: 30 TABLET | Refills: 0 | Status: SHIPPED | OUTPATIENT
Start: 2024-12-20

## 2024-12-20 RX ADMIN — ACETAMINOPHEN 650 MG: 325 TABLET, FILM COATED ORAL at 19:34

## 2024-12-20 RX ADMIN — IBUPROFEN 600 MG: 600 TABLET, FILM COATED ORAL at 11:55

## 2024-12-20 RX ADMIN — ACETAMINOPHEN 650 MG: 325 TABLET, FILM COATED ORAL at 02:27

## 2024-12-20 RX ADMIN — IBUPROFEN 600 MG: 600 TABLET, FILM COATED ORAL at 03:07

## 2024-12-20 NOTE — PLAN OF CARE
Goal Outcome Evaluation:         Pt. Pain controlled.  Pt. Voiding quantity sufficient.  Pt. Ambulating without difficulty.  Pt. Bonding with baby.

## 2024-12-20 NOTE — LACTATION NOTE
This note was copied from a baby's chart.  Pt denies hx of breast surgery, no allergy to wool or foods. Medela gel patches and Nipple ointment provided, instructed on use.   She has a Zomee pump, plans to return to work after maternity leave. Takes prenatal vitamins. Participates in WIC program [facility]. Basic teaching done, states she is unsure about breast feeding due to being painful, states she may attempt again at home & use her pump. Assistance offered, may call for help next feeding. Plans d/c tonight, will follow up as needed.

## 2024-12-20 NOTE — DISCHARGE SUMMARY
[facility]  Delivery Discharge Summary    Primary OB Clinician: Kalie Barnes MD    Admission Diagnosis:  Principal Problem:    Encounter for induction of labor      Discharge Diagnosis:  delivered    Gestational Age: 39w0d    Date of Delivery: 2024    Delivered By:  Kalie Barnes    Delivery Type: Vaginal, Spontaneous     Tubal Ligation: n/a    Intrapartum Course: Uncomplicated delivery.     Postpartum Course:  Pt was admitted and underwent  Spontaneous Vaginal Delivery. Pt was transferred to PP where she had an uncomplicated course. Pt remained AFVSS, had scant lochia and pain was well controlled. Pt d/c home in stable condition and will f/u in office for PP visit as scheduled or PRN. Currently both breast and bottle feeding. Plans on  unsure   for contraception, will discuss more at PP visit.     Physical Exam:    Vitals:   Vitals:    24 1911 24 2100 24 2259 24 0733   BP: 122/75  119/79 96/64   BP Location: Right arm  Left arm Left arm   Patient Position: Sitting  Sitting Lying   Pulse: 92  75 67   Resp: 17  17 14   Temp: 98.6 °F (37 °C)  98.3 °F (36.8 °C) 98.4 °F (36.9 °C)   TempSrc: Oral  Oral Oral   SpO2: 98%  98% 98%   Weight:  102 kg (225 lb)     Height:         Temp (24hrs), Av.2 °F (36.8 °C), Min:97.9 °F (36.6 °C), Max:98.6 °F (37 °C)      General Appearance:    Alert, cooperative, in no acute distress   Abdomen:     Soft non-tender, non-distended, no guarding, no rebound         tenderness.   Extremities:   Moves all extremities well, no edema, no cyanosis, no              Redness.   Incision:  N/A   Fundus:   Firm, below umbilicus     Feeding method: Breastfeeding Status: No    Labs:  Results from last 7 days   Lab Units 24  0526 24  1159   WBC 10*3/mm3 14.30* 9.31   HEMOGLOBIN g/dL 10.0* 10.4*   HEMATOCRIT % 30.8* 31.6*   PLATELETS 10*3/mm3 209 227           Blood Type: RH Positive      Plan:  Discharge to home.    Follow-up appointment with   Cameron in 6 weeks.   All discharge instructions were reviewed with pt including bleeding warnings, s/sx of PP depression, and warning signs in the PP period for which to seek medical attention including but not limited to s/sx of hypertension and thromboembolism.     Annie Estrada NP  12/20/2024  08:30 EST

## 2024-12-22 NOTE — PAYOR COMM NOTE
"NOTIFICATION OF DISCHARGE:        AUTH # [id number]   Sweta GALEANO (29 y.o. Female) 1995      DISCHARGED TO HOME ON 12/20/2024      THANKS,                 Yuki Lema, RN MSN  /UR  [facility]  [phone] office  [phone] fax  [email or web address]    [facility]  NPI: [id number]  Tax: [id number]          Sweta GALEANO (29 y.o. Female)       Date of Birth   1995    Social Security Number       Address   [address] [place] [address] [place] [facility]    Home Phone   [phone]    MRN   [id number]       Mormonism   None    Marital Status                               Admission Date   12/18/24    Admission Type   Elective    Admitting Provider   Kalie Barnes MD    Attending Provider       Department, Room/Bed   [facility], M408/1       Discharge Date   12/20/2024    Discharge Disposition   Home or Self Care    Discharge Destination   Home                              Attending Provider: (none)   Allergies: No Known Allergies    Isolation: None   Infection: None   Code Status: Prior    Ht: 172.7 cm (68\")   Wt: 102 kg (225 lb)    Admission Cmt: None   Principal Problem: Encounter for induction of labor [Z34.90]                   Active Insurance as of 12/18/2024       Primary Coverage       Payor Plan Insurance Group Employer/Plan Group    [employer] BLUE CROSS [employer] BLUE CROSS BLUE SHIELD PPO [id number]       Payor Plan Address Payor Plan Phone Number Payor Plan Fax Number Effective Dates    [address] [phone]  5/12/2024 - None Entered    [facility] [address]         Subscriber Name Subscriber Birth Date Member ID       SWETA GALEANO 1995 [id number]               Secondary Coverage       Payor Plan Insurance Group Employer/Plan Group    [employer] MEDICAID [facility] -ZULY [id number]       Payor Plan Address Payor Plan Phone Number Payor Plan Fax Number Effective Dates    MAIL STOP:  "  2024 - None Entered    [address] [address]       [facility] [address]         Subscriber Name Subscriber Birth Date Member ID       SWETA GALEANO 1995 [id number]                     Emergency Contacts        (Rel.) Home Phone Work Phone Mobile Phone    GERMÁN SOLANO (Spouse) -- -- [phone]                 Discharge Summary        Annie Estrada, NP at 24 0830          [facility]  Delivery Discharge Summary    Primary OB Clinician: Kalie Barnes MD    Admission Diagnosis:  Principal Problem:    Encounter for induction of labor      Discharge Diagnosis:  delivered    Gestational Age: 39w0d    Date of Delivery: 2024    Delivered By:  Kalie Barnes    Delivery Type: Vaginal, Spontaneous     Tubal Ligation: n/a    Intrapartum Course: Uncomplicated delivery.     Postpartum Course:  Pt was admitted and underwent  Spontaneous Vaginal Delivery. Pt was transferred to PP where she had an uncomplicated course. Pt remained AFVSS, had scant lochia and pain was well controlled. Pt d/c home in stable condition and will f/u in office for PP visit as scheduled or PRN. Currently both breast and bottle feeding. Plans on  unsure   for contraception, will discuss more at PP visit.     Physical Exam:    Vitals:   Vitals:    24 1911 24 2100 24 2259 24 0733   BP: 122/75  119/79 96/64   BP Location: Right arm  Left arm Left arm   Patient Position: Sitting  Sitting Lying   Pulse: 92  75 67   Resp: 17  17 14   Temp: 98.6 °F (37 °C)  98.3 °F (36.8 °C) 98.4 °F (36.9 °C)   TempSrc: Oral  Oral Oral   SpO2: 98%  98% 98%   Weight:  102 kg (225 lb)     Height:         Temp (24hrs), Av.2 °F (36.8 °C), Min:97.9 °F (36.6 °C), Max:98.6 °F (37 °C)      General Appearance:    Alert, cooperative, in no acute distress   Abdomen:     Soft non-tender, non-distended, no guarding, no rebound         tenderness.   Extremities:   Moves all extremities well, no edema, no  cyanosis, no              Redness.   Incision:  N/A   Fundus:   Firm, below umbilicus     Feeding method: Breastfeeding Status: No    Labs:  Results from last 7 days   Lab Units 12/19/24  0526 12/18/24  1159   WBC 10*3/mm3 14.30* 9.31   HEMOGLOBIN g/dL 10.0* 10.4*   HEMATOCRIT % 30.8* 31.6*   PLATELETS 10*3/mm3 209 227           Blood Type: RH Positive      Plan:  Discharge to home.    Follow-up appointment with Dr Beauchamp in 6 weeks.   All discharge instructions were reviewed with pt including bleeding warnings, s/sx of PP depression, and warning signs in the PP period for which to seek medical attention including but not limited to s/sx of hypertension and thromboembolism.     Annie Estrada NP  12/20/2024  08:30 EST       Electronically signed by Annie Estrada NP at 12/20/24 0831       Discharge Order (From admission, onward)       Start     Ordered    12/20/24 0830  Discharge patient  Once        Expected Discharge Date: 12/20/24   Discharge Disposition: Home or Self Care   Physician of Record for Attribution - Please select from Treatment Team: SEAN BEAUCHAMP [[id number]]   Review needed by CMO to determine Physician of Record: No      Question Answer Comment   Physician of Record for Attribution - Please select from Treatment Team SEAN BEAUCHAMP    Review needed by CMO to determine Physician of Record No        12/20/24 0830

## 2024-12-24 LAB
LAB AP CASE REPORT: NORMAL
LAB AP DIAGNOSIS COMMENT: NORMAL
PATH REPORT.FINAL DX SPEC: NORMAL
PATH REPORT.GROSS SPEC: NORMAL

## 2024-12-31 ENCOUNTER — MATERNAL SCREENING (OUTPATIENT)
Dept: CALL CENTER | Facility: HOSPITAL | Age: 29
End: 2024-12-31
Payer: COMMERCIAL

## 2024-12-31 NOTE — OUTREACH NOTE
Maternal Screening Survey      Flowsheet Row Responses   Facility patient discharged from? [facility]   Attempt successful? No   Unsuccessful attempts Attempt 1              KRISTY MASTERS - Registered Nurse

## 2024-12-31 NOTE — OUTREACH NOTE
Maternal Screening Survey      Flowsheet Row Responses   Facility patient discharged from? [facility]   Attempt successful? No   Unsuccessful attempts Attempt 2   Revoke Phone number issues  [Phone number not in service.]              Amy CURTIS - Registered Nurse